# Patient Record
(demographics unavailable — no encounter records)

---

## 2017-10-31 NOTE — RADIOLOGY REPORT (SQ)
EXAM DESCRIPTION:  C SP 4 OR 5 VIEWS



COMPLETED DATE/TIME:  10/31/2017 4:31 pm



REASON FOR STUDY:  CERVICALGIA M54.5  LOW BACK PAIN M25.521  PAIN IN RIGHT ELBOW M25.561  PAIN IN RIG
HT KNEE



COMPARISON:  None.



NUMBER OF VIEWS:  Five views.



TECHNIQUE:  AP, lateral, obliques and odontoid radiographic images acquired of the cervical spine.



LIMITATIONS:  None.



FINDINGS:  MINERALIZATION: Normal.

ALIGNMENT: Anatomic.

VERTEBRAE: Vertebral bodies of normal height.

DISCS: No significant osteophytes or sclerosis. Disc height maintained.

FORAMINA: No osteophytes or foraminal narrowing.

LATERAL AND POSTERIOR ELEMENTS: Facets, lateral masses and spinous processes without significant find
ings.

HARDWARE: None in the spine.

SOFT TISSUES: No masses or calcifications. Lung apices clear.

OTHER: No other significant finding.



IMPRESSION:  NO SIGNIFICANT RADIOGRAPHIC FINDING IN THE CERVICAL SPINE.



TECHNICAL DOCUMENTATION:  JOB ID:  [id number]

 2011 [employer]- All Rights Reserved

## 2017-10-31 NOTE — RADIOLOGY REPORT (SQ)
EXAM DESCRIPTION:  KNEE LEFT 4 VIEWS



COMPLETED DATE/TIME:  10/31/2017 4:31 pm



REASON FOR STUDY:  PAIN IN LEFT KNEE M54.5  LOW BACK PAIN M25.521  PAIN IN RIGHT ELBOW M25.561  PAIN 
IN RIGHT KNEE



COMPARISON:  None.



NUMBER OF VIEWS:  Four views.



TECHNIQUE:  AP, lateral, and both oblique radiographic images acquired of the left knee.



LIMITATIONS:  None.



FINDINGS:  MINERALIZATION: Normal.

BONES: No acute fracture or dislocation. No worrisome bone lesions.  Incidental bipartite patella. No
 significant osteophytes.

JOINT: No effusion.  No chondrocalcinosis.

OTHER: No other significant finding.



IMPRESSION:  NEGATIVE STUDY OF THE LEFT KNEE. NO EXPLANATION FOR PAIN.



TECHNICAL DOCUMENTATION:  JOB ID:  [id number]

 2011 [employer]- All Rights Reserved

## 2017-10-31 NOTE — RADIOLOGY REPORT (SQ)
EXAM DESCRIPTION:  LUMBAR SPINE COMPLETE



COMPLETED DATE/TIME:  10/31/2017 4:31 pm



REASON FOR STUDY:  LOW BACK PAIN M54.5  LOW BACK PAIN M25.521  PAIN IN RIGHT ELBOW M25.561  PAIN IN R
IGHT KNEE



COMPARISON:  None.



NUMBER OF VIEWS:  Five views including obliques.



TECHNIQUE:  AP, lateral, oblique, and sacral radiographic images acquired of the lumbar spine.



LIMITATIONS:  None.



FINDINGS:  MINERALIZATION: Normal.

SEGMENTATION: Normal.  No transitional anatomy.

ALIGNMENT: Normal.

VERTEBRAE: Maintained height.  No fracture or worrisome bone lesion.

DISCS: Preserved height.  No significant osteophytes or end plate irregularity.

POSTERIOR ELEMENTS: Pedicles and facets are intact.  No pars defect or posterior arch defects.

HARDWARE: None in the spine.

PARASPINAL SOFT TISSUES: Normal.

PELVIS: Intact as visualized. No fractures or worrisome bone lesions. SI joints intact.

OTHER: No other significant finding.



IMPRESSION:  NORMAL 5 VIEW LUMBAR SPINE.



TECHNICAL DOCUMENTATION:  JOB ID:  [id number]

 2011 [employer]- All Rights Reserved

## 2017-10-31 NOTE — RADIOLOGY REPORT (SQ)
EXAM DESCRIPTION:  ELBOW RIGHT >2 VIEWS



COMPLETED DATE/TIME:  10/31/2017 4:31 pm



REASON FOR STUDY:  PAIN IN RIGHT ELBOW M54.5  LOW BACK PAIN M25.521  PAIN IN RIGHT ELBOW M25.561  TYRONE
N IN RIGHT KNEE



COMPARISON:  None.



NUMBER OF VIEWS:  Four view.



TECHNIQUE:  AP, lateral, and both oblique radiographic images acquired of the right elbow.



LIMITATIONS:  None.



FINDINGS:  MINERALIZATION: Normal.

BONES: No acute fracture or dislocation. No worrisome bone lesions. No significant osteophytes.

JOINT: No effusions.

SOFT TISSUES: No soft tissue swelling.  No foreign body.

OTHER: No other significant finding.



IMPRESSION:  NEGATIVE STUDY OF THE RIGHT ELBOW.  NO EXPLANATION FOR PAIN.



TECHNICAL DOCUMENTATION:  JOB ID:  [id number]

 2011 [employer]- All Rights Reserved

## 2017-10-31 NOTE — RADIOLOGY REPORT (SQ)
EXAM DESCRIPTION:  KNEE RIGHT 4 VIEWS



COMPLETED DATE/TIME:  10/31/2017 4:31 pm



REASON FOR STUDY:  PAIN IN RIGHT KNEE M54.5  LOW BACK PAIN M25.521  PAIN IN RIGHT ELBOW M25.561  PAIN
 IN RIGHT KNEE



COMPARISON:  None.



NUMBER OF VIEWS:  Four views.



TECHNIQUE:  AP, lateral, and both oblique radiographic images acquired of the right knee.



LIMITATIONS:  None.



FINDINGS:  MINERALIZATION: Normal.

BONES: No acute fracture or dislocation. No worrisome bone lesions. No significant osteophytes.

JOINT: No effusion.  No chondrocalcinosis.

OTHER: No other significant finding.



IMPRESSION:  NEGATIVE STUDY OF THE RIGHT KNEE. NO EXPLANATION FOR PAIN.



TECHNICAL DOCUMENTATION:  JOB ID:  [id number]

 2011 [employer]- All Rights Reserved

## 2018-02-10 NOTE — RADIOLOGY REPORT (SQ)
EXAM DESCRIPTION:  MRI LUMBAR SPINE WITHOUT



COMPLETED DATE/TIME:  2/10/2018 11:57 am



REASON FOR STUDY:  CHRONIC LOW BACK PAIN, PAIN IN BOTH KNEES M25.562  PAIN IN LEFT KNEE M25.561  PAIN
 IN RIGHT KNEE M54.5  LOW BACK PAIN



COMPARISON:  None.



TECHNIQUE:  Sagittal and Axial imaging includes T1, T2, STIR and gradient echo sequences. Coronal T2/
HASTE imaging.



LIMITATIONS:  None.



FINDINGS:  VISUALIZED UPPER ABDOMEN:  Limited evaluation. No acute or suspicious findings suggested.

SEGMENTATION: No transitional anatomy. The lowest well-developed disc space is labeled L5-S1.

ALIGNMENT: Anatomic.

VERTEBRAE: Intact.

BONE MARROW: Normal. No marrow replacement or reactive changes.

DISC SIGNAL: Normal. No significant abnormal signal or loss of height.

POSTERIOR ELEMENTS:  Generally intact.  No pars defect evident.

HARDWARE: None in the spine.

CORD AND CONUS: Normal in size and signal intensity. Conus at the appropriate level.

SOFT TISSUES: No aortic aneurysm seen. No bulky retroperitoneal adenopathy or mass. No paraspinal mas
s or fluid.

L1-L2: Mild asymmetric left disc bulge with mild narrowing of the exit foramina.

L2-L3: No significant spinal stenosis or exit foraminal stenosis.

L3-L4: Disc bulge with left lateral protrusion.  Compression of the exiting left L3 root.  Mild facet
 hypertrophy.

L4-L5: Generalized disc bulge asymmetric right.  Mild narrowing of the left exit foramina and moderat
e narrowing of the right exit foramina.

L5-S1: Asymmetric right disc bulge with fissure.  Mild narrowing of the right exit foramina.

LOWER THORACIC: Incompletely imaged.  No stenosis seen.

SACRUM: Visualized upper sacrum intact.

OTHER: No other significant findings.



IMPRESSION:  Multilevel spondylosis with milder moderate narrowing of multiple exit foramina.

Most prominent at L3-4 with there is a left lateral protrusion causing compression of the exiting lef
t L3 root.

L4-5 with moderate narrowing of the right exit foramina secondary to disc bulge.



TECHNICAL DOCUMENTATION:  JOB ID:  [id number]

 2011 [employer]- All Rights Reserved

## 2018-02-12 NOTE — RADIOLOGY REPORT (SQ)
EXAM DESCRIPTION:  MRI BILAT LOWER JOINT WITHOUT



COMPLETED DATE/TIME:  2/10/2018 11:57 am



REASON FOR STUDY:  CHRONIC LOW BACK PAIN, PAIN IN BOTH KNEES M25.562  PAIN IN LEFT KNEE M25.561  PAIN
 IN RIGHT KNEE M54.5  LOW BACK PAIN



COMPARISON:  None.



TECHNIQUE:  Right and leftknee images acquired and stored on PACS.  Multiplanar images include fat se
nsitive sequences as T1, water sensitive sequences as FST2 or STIR, cartilage sensitive sequences as 
FSPD, and gradient echo sequences.



LIMITATIONS:  None.



FINDINGS:  Right knee:  JOINT AND BURSAE: Small joint effusion.  No popliteal cyst.

BONE CORTEX AND MARROW: No alteration of signal to suggest marrow replacement. No worrisome bone lesi
ons. No occult fracture.

ACL: Intact. No degeneration or ganglion cyst.

PCL: Intact.

MCL: Intact. No periligamentous edema or fluid.

LCL: Intact. No periligamentous edema or fluid.

MEDIAL MENISCUS: Grade 2 signal.

LATERAL MENISCUS: No tears. No abnormal signal.

MEDIAL COMPARTMENT: Cartilage preserved. No bone bruises or reactive marrow edema. No osteophytes.

LATERAL COMPARTMENT: Cartilage preserved. No bone bruises or reactive marrow edema. No osteophytes.

PATELLA: Mild chondromalacia with fibrillation.  . No subchondral cysts. Medial and lateral retinacul
a intact.

EXTENSOR MECHANISM: Intact. Quadriceps and patella tendons normal.

SOFT TISSUES: Adjacent muscles and subcutaneous tissues normal.  Normal flow void in popliteal artery
 and vein.

OTHER: No other significant finding.

Left knee:  JOINT AND BURSAE: Small joint effusion effusion.

BONE CORTEX AND MARROW: No alteration of signal to suggest marrow replacement. No worrisome bone lesi
ons. No occult fracture.

ACL: Intact. No degeneration or ganglion cyst.

PCL: Intact.

MCL: Intact. No periligamentous edema or fluid.

LCL: Intact. No periligamentous edema or fluid.

MEDIAL MENISCUS: Grade 2 signal.

LATERAL MENISCUS: No tears. No abnormal signal.

MEDIAL COMPARTMENT: Cartilage preserved. No bone bruises or reactive marrow edema. No osteophytes.

LATERAL COMPARTMENT: Cartilage preserved. No bone bruises or reactive marrow edema. No osteophytes.

PATELLA: Bipartite patella.  Mild fibrillation of the patellar cartilage.  Trochlear cartilage intact
.

EXTENSOR MECHANISM: Intact. Quadriceps and patella tendons normal.

SOFT TISSUES: Adjacent muscles and subcutaneous tissues normal.  Normal flow void in popliteal artery
 and vein.

OTHER: No other significant finding.



IMPRESSION:  Grade 2 signal medial menisci.

Small joint effusions.



IMPRESSION:  Ganglion cysts popliteal tendon right.

Mild chondromalacia right.

Bipartite patella left.



TECHNICAL DOCUMENTATION:  JOB ID:  [id number]

 2011 [employer]- All Rights Reserved